# Patient Record
Sex: MALE | Race: WHITE | URBAN - METROPOLITAN AREA
[De-identification: names, ages, dates, MRNs, and addresses within clinical notes are randomized per-mention and may not be internally consistent; named-entity substitution may affect disease eponyms.]

---

## 2024-03-01 ENCOUNTER — LAB (OUTPATIENT)
Dept: URBAN - METROPOLITAN AREA CLINIC 19 | Facility: CLINIC | Age: 69
End: 2024-03-01

## 2024-03-01 ENCOUNTER — OV NP (OUTPATIENT)
Dept: URBAN - METROPOLITAN AREA CLINIC 19 | Facility: CLINIC | Age: 69
End: 2024-03-01
Payer: COMMERCIAL

## 2024-03-01 VITALS
HEART RATE: 94 BPM | SYSTOLIC BLOOD PRESSURE: 142 MMHG | DIASTOLIC BLOOD PRESSURE: 84 MMHG | HEIGHT: 70 IN | WEIGHT: 172.6 LBS | BODY MASS INDEX: 24.71 KG/M2 | TEMPERATURE: 96.9 F

## 2024-03-01 DIAGNOSIS — Z86.010 HISTORY OF COLON POLYPS: ICD-10-CM

## 2024-03-01 DIAGNOSIS — R10.84 ABDOMINAL CRAMPING, GENERALIZED: ICD-10-CM

## 2024-03-01 DIAGNOSIS — R14.2 ERUCTATION: ICD-10-CM

## 2024-03-01 PROCEDURE — 99204 OFFICE O/P NEW MOD 45 MIN: CPT | Performed by: INTERNAL MEDICINE

## 2024-03-01 PROCEDURE — 99244 OFF/OP CNSLTJ NEW/EST MOD 40: CPT | Performed by: INTERNAL MEDICINE

## 2024-03-01 RX ORDER — SODIUM, POTASSIUM,MAG SULFATES 17.5-3.13G
TAKE 254 ML SOLUTION, RECONSTITUTED, ORAL ORAL AS DIRECTED
Qty: 1 KIT | Refills: 0 | OUTPATIENT
Start: 2024-03-01 | End: 2024-03-02

## 2024-03-01 RX ORDER — PRAVASTATIN SODIUM 40 MG/1
1 TABLET TABLET ORAL ONCE A DAY
Status: ACTIVE | COMMUNITY

## 2024-03-01 RX ORDER — BIMATOPROST 0.1 MG/ML
1 DROP INTO AFFECTED EYE IN THE EVENING SOLUTION/ DROPS OPHTHALMIC ONCE A DAY
Status: ACTIVE | COMMUNITY

## 2024-03-01 NOTE — HPI-TODAY'S VISIT:
Patient presents as a referral from Dr. Emily Lobo for a surveillance colonoscopy due to a prior history of colon polyps. A copy of this note will be sent to the referring provider. I performed the patient's last colonoscopy on 4/9/19 and found diverticulosis and hemorrhoids.  The patient states that he was visiting his step-father in the hospital in November 2023 when he starting having acute lower abdominal cramping and increased urinary frequency. He went to his PCP and testing was reportedly normal. The pain has been off and on since then - he had a CT scan done on Tuesday. No issues were seen with the colon except for diverticulosis (without diverticulitis), but there was some esophageal wall thickening in the setting of a small hiatal hernia and reflux. He does complain of some excessive burping. We discussed probable IBS (post-infectious?) versus lactose intolerance. Will review CT scan and will perform his surveillance colonoscopy. Will treat empirically for IBS.

## 2024-03-27 ENCOUNTER — COL/EGD (OUTPATIENT)
Dept: URBAN - METROPOLITAN AREA SURGERY CENTER 31 | Facility: SURGERY CENTER | Age: 69
End: 2024-03-27

## 2024-03-27 RX ORDER — BIMATOPROST 0.1 MG/ML
1 DROP INTO AFFECTED EYE IN THE EVENING SOLUTION/ DROPS OPHTHALMIC ONCE A DAY
Status: ACTIVE | COMMUNITY

## 2024-03-27 RX ORDER — PRAVASTATIN SODIUM 40 MG/1
1 TABLET TABLET ORAL ONCE A DAY
Status: ACTIVE | COMMUNITY

## 2024-06-04 ENCOUNTER — OFFICE VISIT (OUTPATIENT)
Dept: URBAN - METROPOLITAN AREA CLINIC 19 | Facility: CLINIC | Age: 69
End: 2024-06-04
Payer: COMMERCIAL

## 2024-06-04 ENCOUNTER — DASHBOARD ENCOUNTERS (OUTPATIENT)
Age: 69
End: 2024-06-04

## 2024-06-04 VITALS
DIASTOLIC BLOOD PRESSURE: 74 MMHG | HEIGHT: 70 IN | SYSTOLIC BLOOD PRESSURE: 144 MMHG | TEMPERATURE: 97.4 F | WEIGHT: 175.6 LBS | HEART RATE: 77 BPM | BODY MASS INDEX: 25.14 KG/M2

## 2024-06-04 DIAGNOSIS — K22.70 BARRETT'S ESOPHAGUS WITHOUT DYSPLASIA: ICD-10-CM

## 2024-06-04 DIAGNOSIS — K64.8 HEMORRHOIDS, INTERNAL: ICD-10-CM

## 2024-06-04 DIAGNOSIS — Z86.010 HISTORY OF COLON POLYPS: ICD-10-CM

## 2024-06-04 PROCEDURE — 99213 OFFICE O/P EST LOW 20 MIN: CPT | Performed by: INTERNAL MEDICINE

## 2024-06-04 RX ORDER — BIMATOPROST 0.1 MG/ML
1 DROP INTO AFFECTED EYE IN THE EVENING SOLUTION/ DROPS OPHTHALMIC ONCE A DAY
Status: ACTIVE | COMMUNITY

## 2024-06-04 RX ORDER — PRAVASTATIN SODIUM 40 MG/1
1 TABLET TABLET ORAL ONCE A DAY
Status: ACTIVE | COMMUNITY

## 2024-06-04 NOTE — HPI-TODAY'S VISIT:
3/1/24: Patient presents as a referral from Dr. Emiyl Lobo for a surveillance colonoscopy due to a prior history of colon polyps. A copy of this note will be sent to the referring provider. I performed the patient's last colonoscopy on 4/9/19 and found diverticulosis and hemorrhoids.  The patient states that he was visiting his step-father in the hospital in November 2023 when he starting having acute lower abdominal cramping and increased urinary frequency. He went to his PCP and testing was reportedly normal. The pain has been off and on since then - he had a CT scan done on Tuesday. No issues were seen with the colon except for diverticulosis (without diverticulitis), but there was some esophageal wall thickening in the setting of a small hiatal hernia and reflux. He does complain of some excessive burping. We discussed probable IBS (post-infectious?) versus lactose intolerance. Will review CT scan and will perform his surveillance colonoscopy. Will treat empirically for IBS.  6/4/24: Patient presents for reevaluation after his EGD/colonoscopy, which I performed on 3/27/24. 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 No malignancy. Colon was normal except for hemorrhoids and hypertrophic anal papillae. Repeat EGD/colonoscopy recommended in 5 years. Patient feels well today - his bowel habits are normal, and he has no heartburn. He has changed his dietary habits - never eats food right before going to bed. Avoids spicy foods. He takes daily Metamucil. He did note that he has red eyes after every colonoscopy, including this last time. Suspects that he may have some reaction to propofol.